# Patient Record
Sex: FEMALE
[De-identification: names, ages, dates, MRNs, and addresses within clinical notes are randomized per-mention and may not be internally consistent; named-entity substitution may affect disease eponyms.]

---

## 2019-10-21 ENCOUNTER — APPOINTMENT (OUTPATIENT)
Dept: OTOLARYNGOLOGY | Facility: CLINIC | Age: 39
End: 2019-10-21
Payer: COMMERCIAL

## 2019-10-21 VITALS
SYSTOLIC BLOOD PRESSURE: 113 MMHG | DIASTOLIC BLOOD PRESSURE: 77 MMHG | TEMPERATURE: 97.9 F | RESPIRATION RATE: 17 BRPM | HEART RATE: 77 BPM | BODY MASS INDEX: 29.02 KG/M2 | OXYGEN SATURATION: 99 % | HEIGHT: 64 IN | WEIGHT: 170 LBS

## 2019-10-21 DIAGNOSIS — R68.89 OTHER GENERAL SYMPTOMS AND SIGNS: ICD-10-CM

## 2019-10-21 DIAGNOSIS — J35.8 OTHER CHRONIC DISEASES OF TONSILS AND ADENOIDS: ICD-10-CM

## 2019-10-21 DIAGNOSIS — J34.2 DEVIATED NASAL SEPTUM: ICD-10-CM

## 2019-10-21 DIAGNOSIS — J35.1 HYPERTROPHY OF TONSILS: ICD-10-CM

## 2019-10-21 PROBLEM — Z00.00 ENCOUNTER FOR PREVENTIVE HEALTH EXAMINATION: Status: ACTIVE | Noted: 2019-10-21

## 2019-10-21 PROCEDURE — 31575 DIAGNOSTIC LARYNGOSCOPY: CPT

## 2019-10-21 PROCEDURE — 99203 OFFICE O/P NEW LOW 30 MIN: CPT | Mod: 25

## 2019-10-21 NOTE — HISTORY OF PRESENT ILLNESS
[de-identified] : 39 years old female patient with history of Persistent enlarged tonsils and tonsil stones for the past 5 years.    Patient is present today in the office with Deviated Nasal Septum.  Nasal Airway Obstruction.  Bilateral deep cryptic tonsils with tonsil stones.

## 2019-10-21 NOTE — PROCEDURE
[Congested] : congested [Deviated to the Lt] : deviated to the left [Image(s) Captured] : image(s) captured and filed [Topical Lidocaine] : topical lidocaine [Flexible Endoscope] : examined with the flexible endoscope [Serial Number: ___] : Serial Number: [unfilled] [de-identified] : Deviated Nasal Septum.  Nasal Airway Obstruction.  Bilateral deep cryptic tonsils with tonsil stones.

## 2019-10-21 NOTE — REVIEW OF SYSTEMS
[Patient Intake Form Reviewed] : Patient intake form was reviewed [As Noted in HPI] : as noted in HPI [Throat Pain] : throat pain [Throat Dryness] : throat dryness [Negative] : Heme/Lymph

## 2019-10-21 NOTE — REASON FOR VISIT
[Initial Evaluation] : an initial evaluation for [FreeTextEntry2] : Persistent enlarged tonsils and tonsil stones for the past 5 years.  Patient states her level of severity is a level 10 out of 10 and it occurs constant.  Patient states nothing helps to improve or worsens her Persistent enlarged tonsils and tonsil stones for the past 5 years.

## 2019-11-04 ENCOUNTER — TRANSCRIPTION ENCOUNTER (OUTPATIENT)
Age: 39
End: 2019-11-04

## 2020-01-13 ENCOUNTER — APPOINTMENT (OUTPATIENT)
Dept: INTERNAL MEDICINE | Facility: CLINIC | Age: 40
End: 2020-01-13
Payer: COMMERCIAL

## 2020-01-13 VITALS
HEART RATE: 70 BPM | DIASTOLIC BLOOD PRESSURE: 84 MMHG | TEMPERATURE: 98 F | SYSTOLIC BLOOD PRESSURE: 110 MMHG | WEIGHT: 169 LBS | BODY MASS INDEX: 28.85 KG/M2 | HEIGHT: 64 IN | OXYGEN SATURATION: 99 %

## 2020-01-13 DIAGNOSIS — Z78.9 OTHER SPECIFIED HEALTH STATUS: ICD-10-CM

## 2020-01-13 DIAGNOSIS — J20.8 ACUTE BRONCHITIS DUE TO OTHER SPECIFIED ORGANISMS: ICD-10-CM

## 2020-01-13 LAB
CYTOLOGY CVX/VAG DOC THIN PREP: NORMAL
FLUAV SPEC QL CULT: NEGATIVE
FLUBV AG SPEC QL IA: NEGATIVE

## 2020-01-13 PROCEDURE — 87804 INFLUENZA ASSAY W/OPTIC: CPT | Mod: QW

## 2020-01-13 PROCEDURE — 99203 OFFICE O/P NEW LOW 30 MIN: CPT | Mod: 25

## 2020-01-13 RX ORDER — PREDNISONE 20 MG/1
20 TABLET ORAL DAILY
Qty: 10 | Refills: 0 | Status: ACTIVE | COMMUNITY
Start: 2020-01-13 | End: 1900-01-01

## 2020-01-13 RX ORDER — PROMETHAZINE HYDROCHLORIDE 6.25 MG/5ML
6.25 SOLUTION ORAL EVERY 8 HOURS
Qty: 210 | Refills: 0 | Status: ACTIVE | COMMUNITY
Start: 2020-01-13 | End: 1900-01-01

## 2020-01-13 NOTE — HISTORY OF PRESENT ILLNESS
[FreeTextEntry8] : sick\par - c/o non productive cough, subjective fever and sore throat x 3 days\par - started Friday night w/ painful swallowing, subjective fever Saturday night\par - dizziness today, throat pain has resolved

## 2020-01-31 ENCOUNTER — APPOINTMENT (OUTPATIENT)
Dept: OTOLARYNGOLOGY | Facility: CLINIC | Age: 40
End: 2020-01-31
Payer: COMMERCIAL

## 2020-01-31 VITALS
HEIGHT: 64 IN | WEIGHT: 165 LBS | BODY MASS INDEX: 28.17 KG/M2 | OXYGEN SATURATION: 100 % | SYSTOLIC BLOOD PRESSURE: 112 MMHG | DIASTOLIC BLOOD PRESSURE: 71 MMHG | TEMPERATURE: 98.3 F | HEART RATE: 83 BPM | RESPIRATION RATE: 17 BRPM

## 2020-01-31 DIAGNOSIS — Z83.3 FAMILY HISTORY OF DIABETES MELLITUS: ICD-10-CM

## 2020-01-31 DIAGNOSIS — R22.0 LOCALIZED SWELLING, MASS AND LUMP, HEAD: ICD-10-CM

## 2020-01-31 DIAGNOSIS — H92.09 OTALGIA, UNSPECIFIED EAR: ICD-10-CM

## 2020-01-31 DIAGNOSIS — Z86.69 PERSONAL HISTORY OF OTHER DISEASES OF THE NERVOUS SYSTEM AND SENSE ORGANS: ICD-10-CM

## 2020-01-31 DIAGNOSIS — H92.01 OTALGIA, RIGHT EAR: ICD-10-CM

## 2020-01-31 DIAGNOSIS — R68.84 JAW PAIN: ICD-10-CM

## 2020-01-31 PROCEDURE — 99203 OFFICE O/P NEW LOW 30 MIN: CPT | Mod: NC

## 2020-01-31 NOTE — HISTORY OF PRESENT ILLNESS
[de-identified] : 40 yo woman with 2-3 d h/o progressive otalgia -started with biting into an apple - denies hl or tinnitus it is painful and tender. -f.s.c

## 2020-04-26 ENCOUNTER — MESSAGE (OUTPATIENT)
Age: 40
End: 2020-04-26

## 2021-08-16 ENCOUNTER — NON-APPOINTMENT (OUTPATIENT)
Age: 41
End: 2021-08-16

## 2021-08-16 ENCOUNTER — APPOINTMENT (OUTPATIENT)
Dept: INFECTIOUS DISEASE | Facility: CLINIC | Age: 41
End: 2021-08-16
Payer: COMMERCIAL

## 2021-08-16 VITALS
DIASTOLIC BLOOD PRESSURE: 82 MMHG | BODY MASS INDEX: 29.93 KG/M2 | TEMPERATURE: 98.5 F | OXYGEN SATURATION: 98 % | WEIGHT: 175.31 LBS | HEART RATE: 81 BPM | SYSTOLIC BLOOD PRESSURE: 127 MMHG | HEIGHT: 64 IN

## 2021-08-16 DIAGNOSIS — Z88.0 ALLERGY STATUS TO PENICILLIN: ICD-10-CM

## 2021-08-16 PROCEDURE — 99213 OFFICE O/P EST LOW 20 MIN: CPT

## 2021-08-16 NOTE — HISTORY OF PRESENT ILLNESS
[FreeTextEntry1] : Mrs Rowe is a 40 year old female sent here from Dr. Elio Mcclellan on [facility] for positive syphiillis antibody. rthis was found during labor at [facility] at [facility] . \par RPR negative. Pt states not sure if ever tested in the past . FTA-ABS positive. \par  negative and tested. \par KUNAL called her as well , she spoke to STEPHANY \par Allergies: PCN and Augmentin . \par plan 8/16/2021: \par 1) all labs today. \par 2)  would like to give PCN one dose weekly but concern over PCN allergy. See Dr. Nadeen Ross first in allergy for eval and desentatization. Prefer PCN over Doxycycline. \par 3) see in a month.

## 2023-06-05 ENCOUNTER — APPOINTMENT (OUTPATIENT)
Dept: INFECTIOUS DISEASE | Facility: CLINIC | Age: 43
End: 2023-06-05
Payer: COMMERCIAL

## 2023-06-05 ENCOUNTER — LABORATORY RESULT (OUTPATIENT)
Age: 43
End: 2023-06-05

## 2023-06-05 VITALS
WEIGHT: 173 LBS | SYSTOLIC BLOOD PRESSURE: 108 MMHG | BODY MASS INDEX: 29.53 KG/M2 | TEMPERATURE: 98.6 F | HEIGHT: 64 IN | HEART RATE: 87 BPM | OXYGEN SATURATION: 97 % | DIASTOLIC BLOOD PRESSURE: 74 MMHG

## 2023-06-05 DIAGNOSIS — R76.8 OTHER SPECIFIED ABNORMAL IMMUNOLOGICAL FINDINGS IN SERUM: ICD-10-CM

## 2023-06-05 PROCEDURE — 99213 OFFICE O/P EST LOW 20 MIN: CPT

## 2023-06-05 NOTE — HISTORY OF PRESENT ILLNESS
[FreeTextEntry1] : 6/5/23\par Mrs Rowe is a 42 year old female sent here from Dr. Byrnes  on [facility] for positive syphiillis antibody was found during labor at [facility] at [facility] in Jan 2021.   was negative and tested at that time. prior to the pregnancy she was always syphillis negative . \par RPR negative. Pt states not sure if ever tested in the past . FTA-ABS positive. \par  negative and tested. \par KUNAL called her as well , she spoke to STEPHANY \par Allergies: NKA \par  PCN and Augmentin states not allergic to at all. per 6/5/23 and states not sure how it got on the chart and wanted this removed.  \par \par plan 6/5/23: \par 1) all labs today for false positive syphillis . \par 2) see in 2 weeks as a telehealth. 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)\par Deviated nasal septum (470) (J34.2)\par Ear pain (388.70) (H92.09)\par Enlarged tonsils (474.11) (J35.1)\par Jaw pain (784.92) (R68.84)\par Jaw swelling (784.2) (R22.0)\par Otalgia, right (388.70) (H92.01)\par Viral bronchitis (466.0) (J20.8)\par \par Past Medical History\par History of cataract (V12.49) (Z86.69)\par \par Surgical History\par History of Cataract surgery\par \par Family History\par Family history of diabetes mellitus (V18.0) (Z83.3)\par \par Social History\par Does not use illicit drugs (V49.89) (Z78.9)\par Has no children\par \par Never a smoker\par No alcohol use\par \par Current Meds\par PredniSONE 20 MG Oral Tablet; TAKE 2 TABLET Daily\par Promethazine HCl - 6.25 MG/5ML Oral Syrup; TAKE 10 ML Every 8 hours PRN cough\par \par Allergies\par Amoxicillin TABS\par Penicillins\par

## 2023-06-06 LAB
ALBUMIN SERPL ELPH-MCNC: 4.8 G/DL
ALP BLD-CCNC: 110 U/L
ALT SERPL-CCNC: 30 U/L
ANION GAP SERPL CALC-SCNC: 12 MMOL/L
APPEARANCE: CLEAR
AST SERPL-CCNC: 22 U/L
BACTERIA: ABNORMAL /HPF
BILIRUB SERPL-MCNC: 0.2 MG/DL
BILIRUBIN URINE: NEGATIVE
BLOOD URINE: ABNORMAL
BUN SERPL-MCNC: 17 MG/DL
CALCIUM SERPL-MCNC: 10.3 MG/DL
CAST: 0 /LPF
CHLORIDE SERPL-SCNC: 104 MMOL/L
CO2 SERPL-SCNC: 25 MMOL/L
COLOR: YELLOW
CREAT SERPL-MCNC: 0.9 MG/DL
EGFR: 82 ML/MIN/1.73M2
EPITHELIAL CELLS: 12 /HPF
GLUCOSE QUALITATIVE U: NEGATIVE MG/DL
GLUCOSE SERPL-MCNC: 86 MG/DL
HBV SURFACE AG SER QL: NONREACTIVE
HCT VFR BLD CALC: 41.8 %
HCV AB SER QL: NONREACTIVE
HCV S/CO RATIO: 0.09 S/CO
HGB BLD-MCNC: 12.8 G/DL
HIV1+2 AB SPEC QL IA.RAPID: NONREACTIVE
KETONES URINE: NEGATIVE MG/DL
LEUKOCYTE ESTERASE URINE: ABNORMAL
MCHC RBC-ENTMCNC: 26.4 PG
MCHC RBC-ENTMCNC: 30.6 GM/DL
MCV RBC AUTO: 86.2 FL
MICROSCOPIC-UA: NORMAL
NITRITE URINE: NEGATIVE
PH URINE: 5.5
PLATELET # BLD AUTO: 355 K/UL
POTASSIUM SERPL-SCNC: 4.9 MMOL/L
PROT SERPL-MCNC: 7.4 G/DL
PROTEIN URINE: NEGATIVE MG/DL
RBC # BLD: 4.85 M/UL
RBC # FLD: 14.4 %
RED BLOOD CELLS URINE: 3 /HPF
REVIEW: NORMAL
SODIUM SERPL-SCNC: 141 MMOL/L
SPECIFIC GRAVITY URINE: 1.02
UROBILINOGEN URINE: 0.2 MG/DL
WBC # FLD AUTO: 8.17 K/UL
WHITE BLOOD CELLS URINE: 5 /HPF

## 2023-06-09 LAB — T PALLIDUM AB SER QL IA: POSITIVE

## 2023-10-24 ENCOUNTER — APPOINTMENT (OUTPATIENT)
Dept: INFECTIOUS DISEASE | Facility: CLINIC | Age: 43
End: 2023-10-24

## 2024-04-24 ENCOUNTER — NON-APPOINTMENT (OUTPATIENT)
Age: 44
End: 2024-04-24